# Patient Record
Sex: FEMALE | Race: WHITE
[De-identification: names, ages, dates, MRNs, and addresses within clinical notes are randomized per-mention and may not be internally consistent; named-entity substitution may affect disease eponyms.]

---

## 2021-03-15 ENCOUNTER — HOSPITAL ENCOUNTER (OUTPATIENT)
Dept: HOSPITAL 76 - DI | Age: 56
Discharge: HOME | End: 2021-03-15
Attending: NURSE PRACTITIONER
Payer: COMMERCIAL

## 2021-03-15 DIAGNOSIS — E04.2: Primary | ICD-10-CM

## 2021-03-15 NOTE — ULTRASOUND REPORT
PROCEDURE:  Head or Neck Soft Tissue

 

INDICATIONS:  MULTINODULAR GOITER

 

TECHNIQUE:  

Real-time scanning was performed of the thyroid gland, with image documentation.  

 

COMPARISON:  Thyroid ultrasound 6/17/2016

 

FINDINGS:  

Right:  Thyroid lobe measures 4.6 x 0.9 x 1.3 cm, and is homogeneous in echotexture.  

Left:  Thyroid lobe measures 5.3 x 1.1 x 1.5 cm, and is homogenous in echotexture.  

Isthmus:  5  mm thick.  

 

Nodule number:  One

Location:  Left lower pole

Size:  2.2 x 1.1 x 1.5 m compared to 1.8 x 0.8 x 1.2 cm   cm.  

Composition:  Solid  

Echogenicity:  Hypoechoic   

Shape:  Wider than tall.

Margins:  Smooth

Echogenic foci:  None

Total points:  4

ACR TI-RADS category:  4

 

Nodule number:  Two

Location:  Inferior right isthmus

Size:  0.7 x 0.3 x 0.5 cm.  

Composition:  Predominantly cystic

Echogenicity:  Anechoic 

Shape:  wider than tall.

Margins:  Smooth

Echogenic foci:  None 

Total points:  0 

ACR TI-RADS category:  1

 

IMPRESSION:  

 

 

1. Interval increase in size of category 4 lesion compared to prior exam. Fine-needle aspiration is r
ecommended.

 

 

2. New category 1 isthmus lesion. Secondary to characteristics, no additional follow-up is recommende
d as below.

 

ACR TI-RADS definitions and recommendations:  

TI-RADS 1 (benign): 0 points.  FNA not needed. 

TI-RADS 2 (not suspicious): 2 points.  FNA not needed. 

TI-RADS 3 (mildly suspicious): 3 points. 

?  FNA if 2.5 cm or larger, follow up if 1.5 cm or larger (at 1, 3, and 5 years). 

TI-RADS 4 (moderately suspicious): 4-6 points.  

?  FNA if 1.5 cm or larger, follow up if 1 cm or larger (at 1, 2, 3, and 5 years).  

TI-RADS 5 (highly suspicious): 7 points or more.  

?  FNA if 1 cm or larger, follow up if 0.5 cm or larger (every year for 5 years).  

 

Reviewed by: Tanya Scott MD on 3/15/2021 4:32 PM PDT

Approved by: Tanya Scott MD on 3/15/2021 4:32 PM PDT

 

 

Station ID:  [id number]